# Patient Record
Sex: MALE | Race: BLACK OR AFRICAN AMERICAN | NOT HISPANIC OR LATINO | URBAN - METROPOLITAN AREA
[De-identification: names, ages, dates, MRNs, and addresses within clinical notes are randomized per-mention and may not be internally consistent; named-entity substitution may affect disease eponyms.]

---

## 2017-11-02 ENCOUNTER — HOSPITAL ENCOUNTER (EMERGENCY)
Facility: CLINIC | Age: 57
Discharge: HOME | End: 2017-11-03
Attending: EMERGENCY MEDICINE | Admitting: EMERGENCY MEDICINE
Payer: COMMERCIAL

## 2017-11-02 DIAGNOSIS — F10.920 ALCOHOLIC INTOXICATION WITHOUT COMPLICATION (H): ICD-10-CM

## 2017-11-02 PROCEDURE — 99283 EMERGENCY DEPT VISIT LOW MDM: CPT | Mod: Z6 | Performed by: EMERGENCY MEDICINE

## 2017-11-02 PROCEDURE — 99285 EMERGENCY DEPT VISIT HI MDM: CPT | Performed by: EMERGENCY MEDICINE

## 2017-11-02 NOTE — ED AVS SNAPSHOT
[facility], [place], Emergency Department    [address]    [facility] [address]    Phone:  [phone]    Fax:  [phone]                                       Shayne Gonzalez   MRN: [id number]    Department:  [facility], Emergency Department   Date of Visit:  11/2/2017           After Visit Summary Signature Page     I have received my discharge instructions, and my questions have been answered. I have discussed any challenges I see with this plan with the nurse or doctor.    ..........................................................................................................................................  Patient/Patient Representative Signature      ..........................................................................................................................................  Patient Representative Print Name and Relationship to Patient    ..................................................               ................................................  Date                                            Time    ..........................................................................................................................................  Reviewed by Signature/Title    ...................................................              ..............................................  Date                                                            Time

## 2017-11-02 NOTE — ED AVS SNAPSHOT
[facility], Emergency Department    [address]    [place] [address]    Phone:  [phone]    Fax:  [phone]                                       Shayne Gonzalez   MRN: [id number]    Department:  [facility], Emergency Department   Date of Visit:  11/2/2017           Patient Information     Date Of Birth          1960        Your diagnoses for this visit were:     Alcoholic intoxication without complication (H)        You were seen by Adarsh Horta MD.        Discharge Instructions       Please follow up with your PCP if any further concerns.         24 Hour Appointment Hotline       To make an appointment at any [facility] clinic, call [phone] ([phone]). If you don't have a family doctor or clinic, we will help you find one. [facility] clinics are conveniently located to serve the needs of you and your family.             Review of your medicines      Our records show that you are taking the medicines listed below. If these are incorrect, please call your family doctor or clinic.        Dose / Directions Last dose taken    MIRTAZAPINE PO        Take  by mouth. 15mg.   Refills:  0        RISPERIDONE M-TAB PO        Take  by mouth. 1mg.   Refills:  0                Orders Needing Specimen Collection     None      Pending Results     No orders found for last 3 day(s).            Pending Culture Results     No orders found for last 3 day(s).            Pending Results Instructions     If you had any lab results that were not finalized at the time of your Discharge, you can call the ED Lab Result RN at [phone]. You will be contacted by this team for any positive Lab results or changes in treatment. The nurses are available 7 days a week from 10A to 6:30P.  You can leave a message 24 hours per day and they will return your call.        Thank you for choosing [facility]       Thank you for choosing [facility] for your care. Our goal is always to provide you with excellent  "care. Hearing back from our patients is one way we can continue to improve our services. Please take a few minutes to complete the written survey that you may receive in the mail after you visit with us. Thank you!        [employer] Information     [employer] lets you send messages to your doctor, view your test results, renew your prescriptions, schedule appointments and more. To sign up, go to [email or web address] . Click on \"Log in\" on the left side of the screen, which will take you to the Welcome page. Then click on \"Sign up Now\" on the right side of the page.     You will be asked to enter the access code listed below, as well as some personal information. Please follow the directions to create your username and password.     Your access code is: [email or web address]  Expires: 2018  6:09 AM     Your access code will  in 90 days. If you need help or a new code, please call your [place] clinic or [phone].        Care EveryWhere ID     This is your Care EveryWhere ID. This could be used by other organizations to access your [place] medical records  [id number]        Equal Access to Services     LOU HERNANDEZ : Hadii melissa Collado, waluanada eloy, qaybta kaalmada bonny, mercedes lopez . So [facility] [place] [facility] [phone].    ATENCIÓN: Si habla español, tiene a horan disposición servicios gratuitos de asistencia lingüística. Llame al [phone].    We comply with applicable federal civil rights laws and [place] laws. We do not discriminate on the basis of race, color, national origin, age, disability, sex, sexual orientation, or gender identity.            After Visit Summary       This is your record. Keep this with you and show to your community pharmacist(s) and doctor(s) at your next visit.                  "

## 2017-11-03 VITALS
TEMPERATURE: 98.8 F | OXYGEN SATURATION: 100 % | RESPIRATION RATE: 18 BRPM | SYSTOLIC BLOOD PRESSURE: 141 MMHG | DIASTOLIC BLOOD PRESSURE: 84 MMHG

## 2017-11-03 NOTE — ED NOTES
Bed: ED09  Expected date: 11/2/17  Expected time: 9:15 PM  Means of arrival: Ambulance  Comments:  BUTCH 715  56 M  ETOH

## 2017-11-03 NOTE — ED NOTES
Pt is intoxicated, incorporated, he has been searched and belongings are locked away. Will continue to monitor patient.

## 2017-11-03 NOTE — ED NOTES
Pt was intoxicated and not coorporating with staff. Patient slept for awhile and had 2 sandwiches and a pop.

## 2017-11-04 NOTE — ED PROVIDER NOTES
History     Chief Complaint   Patient presents with     Alcohol Intoxication     Pt found down on a sidewalk     HPI  Shayne Gonzalez is a 56 year old male who presents to emergency room heavily intoxicated via EMS.  He was found down on the sidewalk.  Patient refuses to provide us with any information and is combative at the time of assessment.    I have reviewed the Medications, Allergies, Past Medical and Surgical History, and Social History in the Epic system.    Review of Systems   Reason unable to perform ROS: heavily intoxicated.       Physical Exam   BP: (!) 143/102  Heart Rate: 96  Temp: 97.8  F (36.6  C)  Resp: 18  SpO2: 95 %      Physical Exam   Constitutional: He appears well-developed and well-nourished. No distress.   Heavily intoxicated   HENT:   Head: Normocephalic and atraumatic.   Eyes: No scleral icterus.   Neck: Normal range of motion. Neck supple.   Pulmonary/Chest: Effort normal.   Skin: Skin is warm and dry. No rash noted. He is not diaphoretic. No erythema. No pallor.       ED Course     ED Course     Procedures             Critical Care time:  none             Labs Ordered and Resulted from Time of ED Arrival Up to the Time of Departure from the ED - No data to display         Assessments & Plan (with Medical Decision Making)   56-year-old male intoxicated brought in by EMS.  Patient is walking around his room but is heavily intoxicated and requires further assessment.  At this time he'll be allowed to metabolize his alcohol and be reassessed in the morning.    I have reviewed the nursing notes.    I have reviewed the findings, diagnosis, plan and need for follow up with the patient.    Discharge Medication List as of 11/3/2017  6:09 AM          Final diagnoses:   Alcoholic intoxication without complication (H)       11/2/2017   [facility], [place], EMERGENCY DEPARTMENT     Adarsh Horta MD  11/03/17 0185

## 2021-05-28 ENCOUNTER — RECORDS - HEALTHEAST (OUTPATIENT)
Dept: ADMINISTRATIVE | Facility: CLINIC | Age: 61
End: 2021-05-28

## 2021-06-01 ENCOUNTER — RECORDS - HEALTHEAST (OUTPATIENT)
Dept: ADMINISTRATIVE | Facility: CLINIC | Age: 61
End: 2021-06-01

## 2021-06-03 ENCOUNTER — RECORDS - HEALTHEAST (OUTPATIENT)
Dept: ADMINISTRATIVE | Facility: CLINIC | Age: 61
End: 2021-06-03